# Patient Record
Sex: FEMALE | Race: WHITE | NOT HISPANIC OR LATINO | Employment: UNEMPLOYED | URBAN - METROPOLITAN AREA
[De-identification: names, ages, dates, MRNs, and addresses within clinical notes are randomized per-mention and may not be internally consistent; named-entity substitution may affect disease eponyms.]

---

## 2021-03-03 ENCOUNTER — HOSPITAL ENCOUNTER (OUTPATIENT)
Dept: INFUSION THERAPY | Facility: HOSPITAL | Age: 26
Discharge: HOME | End: 2021-03-03
Attending: ADVANCED PRACTICE MIDWIFE

## 2021-03-03 LAB
ABO GROUP BLD: NORMAL
BLD GP AB SCN SERPL QL: NORMAL
CONV ABD CONTROL: NORMAL
RH BLD: NORMAL

## 2021-03-24 ENCOUNTER — OFFICE VISIT CONVERTED (OUTPATIENT)
Dept: INTERNAL MEDICINE | Facility: CLINIC | Age: 26
End: 2021-03-24
Attending: STUDENT IN AN ORGANIZED HEALTH CARE EDUCATION/TRAINING PROGRAM

## 2021-05-14 NOTE — PROGRESS NOTES
"   Progress Note      Patient Name: Kimberley Bauer   Patient ID: [id number]   Sex: Female   YOB: 1995        Visit Date: 2021    Provider: Gauri Rodrigues MD   Location: [facility]   Location Address: [address] [place] [address], [address]  [place], [place]  [id number]   Location Phone: [phone]          Chief Complaint  · Looking for pediatrician      History Of Present Illness  Currently 31 weeks pregnant with due date of 2021.   Gender: Boy.   Name chosen: Narinder Bauer.   Other children in the home: Yes.   Current OB: Dr. Durham.   Hospital you will be delivering at: [facility].   Current medications: Prenatal 19 29 mg iron- 1 mg oral tablet,chewable   Was an ultrasound completed at 18-22 weeks: Yes.   Results from the ultrasound were: Normal.   Any complications with this pregnancy: No.   Birth plan/thoughts about delivery: .   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   Have you decided on circumcision: Yes.   Method of feeding: Breast.   Any concerns you have about your child's health: None.   Congratulations! If you deliver at [facility], they will notify us when your baby is born, and we will come to the hospital to evaluate the baby. At delivery, you should expect antibiotic prophylaxis on the baby's eyes, Hepatitis B vaccination, and Vitamin K injection. Prior to Discharge, your baby will have congenital heart disease screen, hearing screen, and state mandated  screen that test for approximately 50 different genetic and metabolic diseases that are otherwise difficult to detect. You should plan on staying 48 hours and need to bring a rear facing car seat to take the baby home. The hospital has classes to help prepare for the delivery.   Our office does have both sick and well waiting rooms. Someone is on call and available . We have a breast feeding consultant available. We house CDC " recommended vaccines, have x-rays, can suture and splint.   Call us if you have any questions prior to delivery. If we do not hear from you, best wishes for the remainder of your pregnancy.      She also has a 3 y.o. son  who follows with Dr. Paget (Sandy's children).   She is 31 weeks pregnant and will be having a boy. Due date is . Plans to deliver at [facility]. Planning on having a repeat  with tubal ligation.   Her first pregnancy was via  due to herpes outbreak at the time of her due date.    Denies any hx of asthma, congenital heart disease, or seizure in the family. Mother with hx of unspecified tachycardia. She's seen multiple cardiologist, and has had echocardiogram which have been unrevealing. She has never had a stress test. Baby's father is healthy and w/o any known health conditions except for herpes.          Medication List  Name Date Started Instructions   Prenatal 19 29 mg iron- 1 mg oral tablet,chewable  chew 1 tablet by oral route once daily         Allergy List  Allergen Name Date Reaction Notes   tramadol --  --  --        Allergies Reconciled  Family Medical History  Disease Name Relative/Age Notes   Cancer, Unspecified Mother/   Mother         Social History  Finding Status Start/Stop Quantity Notes   Alcohol Use Never --/-- --  does not drink   Claustophobic Unknown --/-- --  yes   lives with spouse --  --/-- --  --    . --  --/-- --  --    Recreational Drug Use Never --/-- --  no   Tobacco Current every day --/-- 0.5 PPD current every day smoker, 0.5 pack per day, smoked 3 years   Working --  --/-- --  --          Physical Examination  · Constitutional  o Appearance  o : no acute distress, well-nourished  · Head and Face  o Head  o :   § Inspection  § : atraumatic, normocephalic  · Ears, Nose, Mouth and Throat  o Ears  o :   § External Ears  § : normal  o Nose  o :   § Intranasal Exam  § : nares patent  · Respiratory  o Respiratory Effort  o : breathing comfortably,  symmetric chest rise  · Gastrointestinal  o Abdominal Examination  o : Pregnant abdomen   · Neurologic  o Mental Status Examination  o :   § Orientation  § : grossly oriented to person, place and time  o Gait and Station  o :   § Gait Screening  § : normal gait          Assessment  · Prenatal Care, Other Normal Pregnancy     V22.1  Expected mother in search of new pediatrician. Reviewed services provided by our office. All questions and concerns answered.    Problems Reconciled  Plan  · Orders  o Expectant Mother (EXMOM) - V22.1 - 03/24/2021  · Medications  o Medications have been Reconciled  o Transition of Care or Provider Policy            Electronically Signed by: Gauri Rodrigues MD -Author on March 24, 2021 07:12:21 PM

## 2021-05-19 ENCOUNTER — HOSPITAL ENCOUNTER (OUTPATIENT)
Dept: PREADMISSION TESTING | Facility: HOSPITAL | Age: 26
Discharge: HOME | End: 2021-05-19
Attending: OBSTETRICS & GYNECOLOGY

## 2021-05-20 LAB — SARS-COV-2 RNA SPEC QL NAA+PROBE: NOT DETECTED

## 2022-10-12 ENCOUNTER — HOSPITAL ENCOUNTER (EMERGENCY)
Facility: HOSPITAL | Age: 27
Discharge: HOME | End: 2022-10-12
Attending: EMERGENCY MEDICINE | Admitting: EMERGENCY MEDICINE

## 2022-10-12 ENCOUNTER — APPOINTMENT (OUTPATIENT)
Dept: GENERAL RADIOLOGY | Facility: HOSPITAL | Age: 27
End: 2022-10-12

## 2022-10-12 VITALS
HEIGHT: 62 IN | BODY MASS INDEX: 28.93 KG/M2 | TEMPERATURE: 98.4 F | HEART RATE: 77 BPM | DIASTOLIC BLOOD PRESSURE: 65 MMHG | WEIGHT: 157.19 LBS | OXYGEN SATURATION: 100 % | RESPIRATION RATE: 13 BRPM | SYSTOLIC BLOOD PRESSURE: 103 MMHG

## 2022-10-12 DIAGNOSIS — S50.812A ABRASION OF LEFT FOREARM, INITIAL ENCOUNTER: ICD-10-CM

## 2022-10-12 DIAGNOSIS — S60.031A CONTUSION OF RIGHT MIDDLE FINGER WITHOUT DAMAGE TO NAIL, INITIAL ENCOUNTER: ICD-10-CM

## 2022-10-12 DIAGNOSIS — S39.012A STRAIN OF LUMBAR REGION, INITIAL ENCOUNTER: Primary | ICD-10-CM

## 2022-10-12 DIAGNOSIS — S63.681A OTHER SPRAIN OF RIGHT THUMB, INITIAL ENCOUNTER: ICD-10-CM

## 2022-10-12 PROCEDURE — 99284 EMERGENCY DEPT VISIT MOD MDM: CPT

## 2022-10-12 PROCEDURE — 25010000002 HYDROMORPHONE 1 MG/ML SOLUTION: Performed by: EMERGENCY MEDICINE

## 2022-10-12 PROCEDURE — 72100 X-RAY EXAM L-S SPINE 2/3 VWS: CPT

## 2022-10-12 PROCEDURE — 25010000002 KETOROLAC TROMETHAMINE PER 15 MG: Performed by: EMERGENCY MEDICINE

## 2022-10-12 PROCEDURE — 73130 X-RAY EXAM OF HAND: CPT

## 2022-10-12 PROCEDURE — 96375 TX/PRO/DX INJ NEW DRUG ADDON: CPT

## 2022-10-12 PROCEDURE — 96374 THER/PROPH/DIAG INJ IV PUSH: CPT

## 2022-10-12 RX ORDER — KETOROLAC TROMETHAMINE 15 MG/ML
15 INJECTION, SOLUTION INTRAMUSCULAR; INTRAVENOUS ONCE
Status: COMPLETED | OUTPATIENT
Start: 2022-10-12 | End: 2022-10-12

## 2022-10-12 RX ORDER — LORAZEPAM 1 MG/1
1 TABLET ORAL EVERY 8 HOURS PRN
COMMUNITY

## 2022-10-12 RX ADMIN — HYDROMORPHONE HYDROCHLORIDE 0.5 MG: 1 INJECTION, SOLUTION INTRAMUSCULAR; INTRAVENOUS; SUBCUTANEOUS at 09:02

## 2022-10-12 RX ADMIN — KETOROLAC TROMETHAMINE 15 MG: 15 INJECTION, SOLUTION INTRAMUSCULAR; INTRAVENOUS at 10:45
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
## 2022-10-12 NOTE — DISCHARGE INSTRUCTIONS
Apply ice to those areas of discomfort as needed.  Avoid strenuous activity until symptoms resolve.

## 2023-01-09 ENCOUNTER — OFFICE VISIT (OUTPATIENT)
Dept: INTERNAL MEDICINE | Facility: CLINIC | Age: 28
End: 2023-01-09
Payer: COMMERCIAL

## 2023-01-09 VITALS
TEMPERATURE: 98.1 F | WEIGHT: 138.4 LBS | DIASTOLIC BLOOD PRESSURE: 72 MMHG | BODY MASS INDEX: 25.31 KG/M2 | OXYGEN SATURATION: 99 % | HEART RATE: 62 BPM | SYSTOLIC BLOOD PRESSURE: 110 MMHG

## 2023-01-09 DIAGNOSIS — F41.9 ANXIETY: ICD-10-CM

## 2023-01-09 DIAGNOSIS — M54.42 CHRONIC BILATERAL LOW BACK PAIN WITH BILATERAL SCIATICA: ICD-10-CM

## 2023-01-09 DIAGNOSIS — F33.1 MODERATE EPISODE OF RECURRENT MAJOR DEPRESSIVE DISORDER: ICD-10-CM

## 2023-01-09 DIAGNOSIS — Z76.89 ESTABLISHING CARE WITH NEW DOCTOR, ENCOUNTER FOR: Primary | ICD-10-CM

## 2023-01-09 DIAGNOSIS — M54.41 CHRONIC BILATERAL LOW BACK PAIN WITH BILATERAL SCIATICA: ICD-10-CM

## 2023-01-09 DIAGNOSIS — F43.10 PTSD (POST-TRAUMATIC STRESS DISORDER): ICD-10-CM

## 2023-01-09 DIAGNOSIS — G89.29 CHRONIC BILATERAL LOW BACK PAIN WITH BILATERAL SCIATICA: ICD-10-CM

## 2023-01-09 PROCEDURE — 99204 OFFICE O/P NEW MOD 45 MIN: CPT | Performed by: NURSE PRACTITIONER

## 2023-01-09 RX ORDER — BUPROPION HYDROCHLORIDE 150 MG/1
150 TABLET ORAL EVERY MORNING
COMMUNITY
Start: 2022-12-26

## 2023-01-09 RX ORDER — PROPRANOLOL HYDROCHLORIDE 10 MG/1
10 TABLET ORAL DAILY
COMMUNITY
Start: 2022-12-26

## 2023-01-09 NOTE — PROGRESS NOTES
Chief Complaint  Establish Care and Back Pain    Subjective        Kimberley Bauer presents to [facility] for History of Present Illness  Establishment of care, concerns regarding chronic low back pain and depression anxiety follow-up.    Patient is needing to establish with new PCP, she has not had a regular PCP in 2 to 3 years.  Patient reports she was unable to get any answers in regards to her back pain, so she eventually stopped going.  She states that her low back pain has been going on for 3 to 4 years.  She did get some imaging, which showed some narrowing of her spine and there was no course of treatment that was recommended.  She reports that she still has continuous back pain, reporting pain at least daily.  She reports occasional tingling into the lower extremities bilaterally.  No significant numbness.  No loss of bowel or bladder control.  She was mostly recently seen in the emergency room, they did x-rays at that time, no abnormalities.  She has 2 young children, and is concerned that she cannot help them when needed sometimes due to the amount of pain she is having in her back.    Patient is also seen by psychiatry and therapy, she has longstanding PTSD, depression, anxiety.  She has been treated with medications that are all prescribed by them.  They are still working to find a good mix for her.  She does not have any major concerns regarding that today.    She denies any other significant past medical problems.  She would like to get scheduled for annual exam and lab work.       Objective   Vital Signs:   /72 (BP Location: Right arm, Patient Position: Sitting, Cuff Size: Adult)   Pulse 62   Temp 98.1 °F (36.7 °C) (Temporal)   Wt 62.8 kg (138 lb 6.4 oz)   SpO2 99%   BMI 25.31 kg/m²     Physical Exam  Vitals and nursing note reviewed.   Constitutional:       Appearance: Normal appearance. She is normal weight.   HENT:      Head: Normocephalic and atraumatic.    Cardiovascular:      Rate and Rhythm: Normal rate.   Pulmonary:      Effort: Pulmonary effort is normal.   Neurological:      Mental Status: She is alert.   Psychiatric:         Mood and Affect: Mood normal.         Thought Content: Thought content normal.        Result Review :  {The following data was reviewed by CORAL Carpio on 01/09/23                Diagnoses and all orders for this visit:    1. Establishing care with new doctor, encounter for (Primary)    2. Chronic bilateral low back pain with bilateral sciatica  -     Ambulatory Referral to Physical Therapy Evaluate and treat    3. Moderate episode of recurrent major depressive disorder (HCC)    4. Anxiety    5. PTSD (post-traumatic stress disorder)    Other orders  -     diclofenac (VOLTAREN) 50 MG EC tablet; Take 1 tablet by mouth 3 (Three) Times a Day As Needed (pain).  Dispense: 90 tablet; Refill: 1    Patient's primary concern today is her chronic low back pain, we will go ahead and start anti-inflammatories and refer to physical therapy.  We discussed typical course and common medications used for this.  She will follow-up if her symptoms worsen.  MRI would be considered if failing with physical therapy.  Both x-rays were reviewed, 1 from 2022, 1 from 2021, no major concerns seen there.  We will plan to follow-up in 3 months regardless, complete annual physical at that time with labs.      Follow Up   Return in about 3 months (around 4/9/2023) for Recheck, Annual physical.  Patient was given instructions and counseling regarding her condition or for health maintenance advice. Please see specific information pulled into the AVS if appropriate.     CORAL Carpio  1/9/2023  This note was electronically signed.

## 2023-03-07 ENCOUNTER — OFFICE VISIT (OUTPATIENT)
Dept: INTERNAL MEDICINE | Facility: CLINIC | Age: 28
End: 2023-03-07
Payer: COMMERCIAL

## 2023-03-07 VITALS
SYSTOLIC BLOOD PRESSURE: 130 MMHG | BODY MASS INDEX: 24.98 KG/M2 | OXYGEN SATURATION: 100 % | RESPIRATION RATE: 18 BRPM | WEIGHT: 136.6 LBS | TEMPERATURE: 98.4 F | HEART RATE: 77 BPM | DIASTOLIC BLOOD PRESSURE: 88 MMHG

## 2023-03-07 DIAGNOSIS — M54.41 CHRONIC BILATERAL LOW BACK PAIN WITH BILATERAL SCIATICA: Primary | ICD-10-CM

## 2023-03-07 DIAGNOSIS — G89.29 CHRONIC BILATERAL LOW BACK PAIN WITH BILATERAL SCIATICA: Primary | ICD-10-CM

## 2023-03-07 DIAGNOSIS — M54.42 CHRONIC BILATERAL LOW BACK PAIN WITH BILATERAL SCIATICA: Primary | ICD-10-CM

## 2023-03-07 PROCEDURE — 99213 OFFICE O/P EST LOW 20 MIN: CPT | Performed by: NURSE PRACTITIONER

## 2023-03-07 RX ORDER — CYCLOBENZAPRINE HCL 5 MG
5 TABLET ORAL 3 TIMES DAILY PRN
Qty: 30 TABLET | Refills: 0 | Status: SHIPPED | OUTPATIENT
Start: 2023-03-07

## 2023-03-07 RX ORDER — MELOXICAM 15 MG/1
15 TABLET ORAL DAILY
Qty: 30 TABLET | Refills: 1 | Status: SHIPPED | OUTPATIENT
Start: 2023-03-07

## 2023-03-07 RX ORDER — CLORAZEPATE DIPOTASSIUM 7.5 MG/1
1 TABLET ORAL EVERY 12 HOURS SCHEDULED
COMMUNITY
Start: 2023-02-09

## 2023-03-07 NOTE — PROGRESS NOTES
Chief Complaint  Pain (Would like to try different medicine feels like diclofenac doesn't work on top of physical therapy )    Subjective        Kimberley Bauer presents to [facility] for History of Present Illness  Follow-up for low back pain.  Patient reports that she has been using diclofenac for the last 4 weeks and doing physical therapy for the last 3, she has not had any significant relief in her pain.  She wanted to discuss other medications that she could use while continuing with physical therapy.  Her physical therapy regimen has been once weekly for the last 3 weeks, she will be starting twice weekly starting next week.  Plan was for a full 8 weeks.  Reports no significant relief in the pain.  She feels like her hips are looser, but still having discomfort.  Stating that diclofenac has not been helpful.  No other significant concerns or complaints today.       Objective   Vital Signs:   /88 (BP Location: Left arm, Patient Position: Sitting, Cuff Size: Adult)   Pulse 77   Temp 98.4 °F (36.9 °C) (Temporal)   Resp 18   Wt 62 kg (136 lb 9.6 oz)   SpO2 100%   BMI 24.98 kg/m²     Physical Exam  Vitals and nursing note reviewed.   Constitutional:       Appearance: Normal appearance.   HENT:      Head: Normocephalic and atraumatic.   Pulmonary:      Effort: Pulmonary effort is normal.   Neurological:      Mental Status: She is alert.   Psychiatric:         Mood and Affect: Mood normal.         Thought Content: Thought content normal.        Result Review :  {The following data was reviewed by CORAL Carpio on 03/07/23                Diagnoses and all orders for this visit:    1. Chronic bilateral low back pain with bilateral sciatica (Primary)    Other orders  -     meloxicam (MOBIC) 15 MG tablet; Take 1 tablet by mouth Daily.  Dispense: 30 tablet; Refill: 1  -     cyclobenzaprine (FLEXERIL) 5 MG tablet; Take 1 tablet by mouth 3 (Three) Times a Day As Needed for Muscle  Spasms.  Dispense: 30 tablet; Refill: 0    Discussed switching to meloxicam and intermittent use of cyclobenzaprine, just to be used as needed, primarily at night.  Encourage patient to continue with physical therapy, if she can complete at least 3 more weeks, if there is no significant relief, would go ahead and place order for MRI for further investigation.  Patient understands and agrees with plan of care.      Follow Up   No follow-ups on file.  Patient was given instructions and counseling regarding her condition or for health maintenance advice. Please see specific information pulled into the AVS if appropriate.     Jeffery Pino, CORAL  3/7/2023  This note was electronically signed.

## 2023-05-01 RX ORDER — MELOXICAM 15 MG/1
TABLET ORAL
Qty: 30 TABLET | Refills: 1 | Status: SHIPPED | OUTPATIENT
Start: 2023-05-01

## 2023-06-14 ENCOUNTER — OFFICE VISIT (OUTPATIENT)
Dept: INTERNAL MEDICINE | Facility: CLINIC | Age: 28
End: 2023-06-14
Payer: COMMERCIAL

## 2023-06-14 VITALS
DIASTOLIC BLOOD PRESSURE: 80 MMHG | TEMPERATURE: 98.5 F | OXYGEN SATURATION: 100 % | WEIGHT: 140.6 LBS | SYSTOLIC BLOOD PRESSURE: 124 MMHG | HEART RATE: 106 BPM | BODY MASS INDEX: 25.88 KG/M2 | HEIGHT: 62 IN

## 2023-06-14 DIAGNOSIS — M54.41 CHRONIC BILATERAL LOW BACK PAIN WITH BILATERAL SCIATICA: Primary | ICD-10-CM

## 2023-06-14 DIAGNOSIS — G89.29 CHRONIC BILATERAL LOW BACK PAIN WITH BILATERAL SCIATICA: Primary | ICD-10-CM

## 2023-06-14 DIAGNOSIS — M54.42 CHRONIC BILATERAL LOW BACK PAIN WITH BILATERAL SCIATICA: Primary | ICD-10-CM

## 2023-06-14 RX ORDER — ALPRAZOLAM 1 MG/1
1 TABLET ORAL 3 TIMES DAILY PRN
COMMUNITY
Start: 2023-05-31

## 2023-06-14 RX ORDER — BUPROPION HYDROCHLORIDE 300 MG/1
300 TABLET ORAL EVERY MORNING
COMMUNITY
Start: 2023-05-27

## 2023-06-14 NOTE — PROGRESS NOTES
"Chief Complaint  Back Pain (Pt states she is here for follow up on PT as well as wanting to get MRI)    Subjective        Kimberley Bauer presents to [facility] for follow-up for back pain.  Patient has been experiencing pain now for several months, at least 6.  We have tried different anti-inflammatories, muscle relaxers, we referred for physical therapy, she has completed at least 4 sessions with them, did have to miss some due to illness of her or her children.  Pain continues.  She does report some numbness and tingling that radiates around her bilateral hips.  She denies any saddle anesthesia, she has never lost bowel or bladder ability.    Objective   Vital Signs:   /80 (BP Location: Left arm, Patient Position: Sitting, Cuff Size: Adult)   Pulse 106   Temp 98.5 °F (36.9 °C) (Temporal)   Ht 157.5 cm (62\")   Wt 63.8 kg (140 lb 9.6 oz)   SpO2 100%   BMI 25.72 kg/m²     Physical Exam  Vitals and nursing note reviewed.   Constitutional:       Appearance: Normal appearance.   HENT:      Head: Normocephalic and atraumatic.   Pulmonary:      Effort: Pulmonary effort is normal.   Musculoskeletal:      Lumbar back: Tenderness present. No swelling, edema or deformity.   Neurological:      Mental Status: She is alert.      Result Review :  {The following data was reviewed by CORAL Carpio on 06/14/23                Diagnoses and all orders for this visit:    1. Chronic bilateral low back pain with bilateral sciatica (Primary)  -     MRI Lumbar Spine Without Contrast; Future    Discussed with patient neck step would be MRI imaging.  We will get this ordered today.  We will follow-up with her after those results are available.  Can continue with anti-inflammatories for now.  No red flag symptoms.  If any worsening, would recommend very close follow-up including but not limited to emergency room if needed.  Patient understands and agrees.      Follow Up   No follow-ups on " file.  Patient was given instructions and counseling regarding her condition or for health maintenance advice. Please see specific information pulled into the AVS if appropriate.     Jeffery Pino, APRN  6/14/2023  This note was electronically signed.

## 2023-07-31 ENCOUNTER — TELEPHONE (OUTPATIENT)
Dept: INTERNAL MEDICINE | Facility: CLINIC | Age: 28
End: 2023-07-31
Payer: COMMERCIAL

## 2023-07-31 DIAGNOSIS — M53.9 MULTILEVEL DEGENERATIVE DISC DISEASE: Primary | ICD-10-CM

## 2023-07-31 DIAGNOSIS — G89.29 CHRONIC BILATERAL LOW BACK PAIN WITHOUT SCIATICA: ICD-10-CM

## 2023-07-31 DIAGNOSIS — M54.50 CHRONIC BILATERAL LOW BACK PAIN WITHOUT SCIATICA: ICD-10-CM

## 2023-08-23 ENCOUNTER — OFFICE VISIT (OUTPATIENT)
Dept: NEUROSURGERY | Facility: CLINIC | Age: 28
End: 2023-08-23
Payer: COMMERCIAL

## 2023-08-23 VITALS
SYSTOLIC BLOOD PRESSURE: 120 MMHG | HEART RATE: 67 BPM | WEIGHT: 133 LBS | DIASTOLIC BLOOD PRESSURE: 86 MMHG | HEIGHT: 62 IN | BODY MASS INDEX: 24.48 KG/M2

## 2023-08-23 DIAGNOSIS — M54.42 CHRONIC MIDLINE LOW BACK PAIN WITH BILATERAL SCIATICA: ICD-10-CM

## 2023-08-23 DIAGNOSIS — M47.816 LUMBAR FACET ARTHROPATHY: ICD-10-CM

## 2023-08-23 DIAGNOSIS — G89.29 CHRONIC MIDLINE LOW BACK PAIN WITH BILATERAL SCIATICA: ICD-10-CM

## 2023-08-23 DIAGNOSIS — M51.27 HERNIATED NUCLEUS PULPOSUS, L5-S1, LEFT: Primary | ICD-10-CM

## 2023-08-23 DIAGNOSIS — M54.41 CHRONIC MIDLINE LOW BACK PAIN WITH BILATERAL SCIATICA: ICD-10-CM

## 2023-08-23 NOTE — PROGRESS NOTES
"Chief Complaint  Back Pain, Leg Pain (Bilateral to feet), Tingling (Bilateral hips to feet), Numbness (Bilateral hips to feet), and Hip Pain (Bilateral )    Subjective          Kimberley Bauer who is a 27 y.o. year old female who presents to [facility] for Evaluation of the Spine.     The patient complains of pain located in the Lumbar Spine.  Patients states the pain has been present for 6 years.  The pain came on acutely.  The pain scaled level is 6.  The pain does radiate. Dermatomes are located bilaterally Lumbar at: below the knee and to all the toes.  The pain is constant and waxing/waning and described as sharp and \"heaviness\" .  The pain is worse at no particular time of day. Patient states Heat makes the pain better.  Patient states  pushing to have a bowel movement, prolonged standing or sitting, cleaning the house makes the pain worse.    Associated Symptoms Include: Numbness, Tingling, and Weakness. Denies loss of bowel or bladder control, outside of a period of time where she had issues controlling the bladder lasting for several months and then resolving.  Conservative Interventions Include: Physical Therapy that was not very effective. NSAIDs which were ineffective. Chiropractor which was not very effective.    Was this the result of an injury or accident? : No    History of Previous Spinal Surgery?: No     reports that she has been smoking cigarettes. She has been smoking an average of .25 packs per day. She has never used smokeless tobacco.    Review of Systems   Musculoskeletal:  Positive for back pain.      Objective   Vital Signs:   /86   Pulse 67   Ht 157.5 cm (62\")   Wt 60.3 kg (133 lb)   BMI 24.33 kg/mý       Physical Exam  Constitutional:       Appearance: Normal appearance.   Pulmonary:      Effort: Pulmonary effort is normal.   Musculoskeletal:         General: Tenderness (midline lumbar and right lumbar area) present.      Comments: SLR " on the right causes pain in the right lower back   Neurological:      General: No focal deficit present.      Mental Status: She is alert and oriented to person, place, and time.      Sensory: Sensory deficit (reduced in right lower leg) present.      Motor: No weakness.      Deep Tendon Reflexes: Reflexes normal.   Psychiatric:         Mood and Affect: Mood normal.         Behavior: Behavior normal.      Neurologic Exam     Mental Status   Oriented to person, place, and time.      Result Review     I have personally reviewed the MRI of the lumbar spine without contrast from 7/18/2023 which shows multilevel degenerative disc disease and facet arthritis worse at L5-S1 where there is severe left foraminal stenosis and moderate right foraminal stenosis.     Assessment and Plan    Diagnoses and all orders for this visit:    1. Herniated nucleus pulposus, L5-S1, left (Primary)    2. Lumbar facet arthropathy    3. Chronic midline low back pain with bilateral sciatica    She has pain in the back and down both legs to all the toes.    She has some stenosis on the left at L5-S1 which may affect the exiting L5 or the traversing S1 nerve on the left.    We discussed that surgery may be less likely to help for non-specific leg pain. Overall, it would be most likely to help the left leg complaints, unlikely to help the right leg or lower back pain.    She may consider LESB as a conservative treatment. I will refer her to [facility] in [place].    The patient was counseled on basic recommendations for the reduction and prevention of back, neck, or spine pain in association with spinal disorders, including: cessation/avoidance of nicotine use, maintenance of a healthy BMI and weight, focusing on building/maintaining core strength through core exercise, and avoidance of activities which worsen the pain. The patient will monitor for changes in symptoms and notify our clinic of these changes as needed.    She will follow-up here  PRN.    Follow Up   Return if symptoms worsen or fail to improve.  Patient was given instructions and counseling regarding her condition or for health maintenance advice. Please see specific information pulled into the AVS if appropriate.

## 2023-08-24 ENCOUNTER — PATIENT ROUNDING (BHMG ONLY) (OUTPATIENT)
Dept: NEUROLOGY | Facility: CLINIC | Age: 28
End: 2023-08-24
Payer: COMMERCIAL

## 2025-01-23 ENCOUNTER — HOSPITAL ENCOUNTER (EMERGENCY)
Facility: HOSPITAL | Age: 30
Discharge: HOME | End: 2025-01-23
Attending: EMERGENCY MEDICINE

## 2025-01-23 VITALS
BODY MASS INDEX: 23.53 KG/M2 | WEIGHT: 127.87 LBS | HEART RATE: 72 BPM | SYSTOLIC BLOOD PRESSURE: 128 MMHG | TEMPERATURE: 98.8 F | RESPIRATION RATE: 16 BRPM | OXYGEN SATURATION: 100 % | DIASTOLIC BLOOD PRESSURE: 72 MMHG | HEIGHT: 62 IN

## 2025-01-23 DIAGNOSIS — K02.9 DENTAL CARIES: Primary | ICD-10-CM

## 2025-01-23 PROCEDURE — 99282 EMERGENCY DEPT VISIT SF MDM: CPT

## 2025-01-23 PROCEDURE — 96372 THER/PROPH/DIAG INJ SC/IM: CPT

## 2025-01-23 PROCEDURE — 25010000002 KETOROLAC TROMETHAMINE PER 15 MG: Performed by: EMERGENCY MEDICINE

## 2025-01-23 RX ORDER — KETOROLAC TROMETHAMINE 30 MG/ML
60 INJECTION, SOLUTION INTRAMUSCULAR; INTRAVENOUS ONCE
Status: COMPLETED | OUTPATIENT
Start: 2025-01-23 | End: 2025-01-23

## 2025-01-23 RX ORDER — AMOXICILLIN 875 MG/1
875 TABLET, COATED ORAL 2 TIMES DAILY
Qty: 20 TABLET | Refills: 0 | Status: SHIPPED | OUTPATIENT
Start: 2025-01-23

## 2025-01-23 RX ORDER — KETOROLAC TROMETHAMINE 10 MG/1
10 TABLET, FILM COATED ORAL EVERY 6 HOURS PRN
Qty: 15 TABLET | Refills: 0 | Status: SHIPPED | OUTPATIENT
Start: 2025-01-23

## 2025-01-23 RX ADMIN — KETOROLAC TROMETHAMINE 60 MG: 30 INJECTION, SOLUTION INTRAMUSCULAR at 10:44

## 2025-01-23 NOTE — ED PROVIDER NOTES
Time: 10:07 AM EST  Date of encounter:  2025  Independent Historian/Clinical History and Information was obtained by:   Patient    History is limited by: N/A    Chief Complaint: Jaw pain      History of Present Illness:  Patient is a 29 y.o. year old female who presents to the emergency department for evaluation of right-sided jaw pain for the past day.  Patient denies fever and chills.  Patient has no nausea, vomiting, or diarrhea.  Patient has no cough hemoptysis.  Patient also now reports right-sided neck pain and some mild swelling.      Patient Care Team  Primary Care Provider: Jeffery Pino APRN    Past Medical History:     Allergies   Allergen Reactions    Tramadol Rash     Past Medical History:   Diagnosis Date    Depression     Heart palpitations      Past Surgical History:   Procedure Laterality Date     SECTION      x2    WISDOM TOOTH EXTRACTION       History reviewed. No pertinent family history.    Home Medications:  Prior to Admission medications    Medication Sig Start Date End Date Taking? Authorizing Provider   ALPRAZolam (XANAX) 1 MG tablet Take 1 tablet by mouth 3 (Three) Times a Day As Needed for Anxiety.  Patient not taking: Reported on 2023  Maribel Varghese MD   buPROPion XL (WELLBUTRIN XL) 300 MG 24 hr tablet Take 1 tablet by mouth Every Morning.  Patient not taking: Reported on 2023  Maribel Varghese MD        Social History:   Social History     Tobacco Use    Smoking status: Every Day     Current packs/day: 0.25     Types: Cigarettes    Smokeless tobacco: Never   Vaping Use    Vaping status: Never Used   Substance Use Topics    Alcohol use: Not Currently    Drug use: Never         Review of Systems:  Review of Systems   Constitutional:  Negative for chills and fever.   HENT:  Negative for congestion, rhinorrhea and sore throat.    Eyes:  Negative for pain and visual disturbance.   Respiratory:  Negative for apnea, cough,  "chest tightness and shortness of breath.    Cardiovascular:  Negative for chest pain and palpitations.   Gastrointestinal:  Negative for abdominal pain, diarrhea, nausea and vomiting.   Genitourinary:  Negative for difficulty urinating and dysuria.   Musculoskeletal:  Negative for joint swelling and myalgias.   Skin:  Negative for color change.   Neurological:  Negative for seizures and headaches.   Psychiatric/Behavioral: Negative.     All other systems reviewed and are negative.       Physical Exam:  /72 (BP Location: Left arm, Patient Position: Lying)   Pulse 72   Temp 98.8 °F (37.1 °C) (Oral)   Resp 16   Ht 157.5 cm (62\")   Wt 58 kg (127 lb 13.9 oz)   SpO2 100%   BMI 23.39 kg/m²     Physical Exam  Vitals and nursing note reviewed.   Constitutional:       General: She is not in acute distress.     Appearance: Normal appearance. She is not toxic-appearing.   HENT:      Head: Normocephalic and atraumatic.      Jaw: There is normal jaw occlusion.      Mouth/Throat:      Comments: (+) Poor dentition with rotten teeth to right mandible  Eyes:      General: Lids are normal.      Extraocular Movements: Extraocular movements intact.      Conjunctiva/sclera: Conjunctivae normal.      Pupils: Pupils are equal, round, and reactive to light.   Neck:      Comments: (+) Right cervical tender lymphadenopathy  Cardiovascular:      Rate and Rhythm: Normal rate and regular rhythm.      Pulses: Normal pulses.      Heart sounds: Normal heart sounds.   Pulmonary:      Effort: Pulmonary effort is normal. No respiratory distress.      Breath sounds: Normal breath sounds. No wheezing or rhonchi.   Abdominal:      General: Abdomen is flat.      Palpations: Abdomen is soft.      Tenderness: There is no abdominal tenderness. There is no guarding or rebound.   Musculoskeletal:         General: Normal range of motion.      Cervical back: Normal range of motion and neck supple.      Right lower leg: No edema.      Left lower leg: " No edema.   Skin:     General: Skin is warm and dry.   Neurological:      Mental Status: She is alert and oriented to person, place, and time. Mental status is at baseline.   Psychiatric:         Mood and Affect: Mood normal.                    Medical Decision Making:      Comorbidities that affect care:    None    External Notes reviewed:    Previous Clinic Note: Patient was last seen in clinic for back pain.      The following orders were placed and all results were independently analyzed by me:  No orders of the defined types were placed in this encounter.      Medications Given in the Emergency Department:  Medications   ketorolac (TORADOL) injection 60 mg (60 mg Intramuscular Given 1/23/25 1044)        ED Course:         Labs:    Lab Results (last 24 hours)       ** No results found for the last 24 hours. **             Imaging:    No Radiology Exams Resulted Within Past 24 Hours      Differential Diagnosis and Discussion:    Dental Pain: Differential diagnosis includes but is not limited to dental caries, periodontitis, pericoronitis, peridental abscess, gingival abscess, apthous stomatitis, allergic stomatitis, acute necrotizing ulcerative gingivitis, herpetic stomatitis.    PROCEDURES:        No orders to display       Procedures    MDM     Patient with poor dentition and will need antibiotics.  The patient has pharyngeal erythema, exudate, and pain consistent with pharyngitis.  Patient has no signs of abscess.  The patient was started on antibiotics in the Emergency Department. The patient is resting comfortably, feels better, is alert and in no distress after ED treatment. On re-examination the patient does not appear toxic has no meningeal signs, and there's no intractable vomiting. Based on the history, exam, diagnostic testing and reassessment, the patient has no signs of severe sepsis despite this infection.  The patient's vital signs have been stable. The patient's condition is stable and is  appropriate for discharge. The patient will pursue further outpatient evaluation with the primary care physician or other designated or consultant physician as indicated in the discharge instructions. The patient was counseled that a repeat examination within two days is imperative. This can be done as an outpatient. Patient advised to return to the ED if outpatient evaluation is not feasible. The patient was counseled to return to the ER sooner for worsening pain, neck or throat swelling, inability to swallow, respiratory distress. The patient was also counseled to return for worsening fever, chills, altered mental status, intractable vomiting or any other symptoms of concern.                Patient Care Considerations:    NARCOTICS: I considered prescribing opiate pain medication as an outpatient, however patient will be started on anti-inflammatories first.      Consultants/Shared Management Plan:    None    Social Determinants of Health:    Patient is independent, reliable, and has access to care.       Disposition and Care Coordination:    Discharged: The patient is suitable and stable for discharge with no need for consideration of admission.    I have explained the patient´s condition, diagnoses and treatment plan based on the information available to me at this time. I have answered questions and addressed any concerns. The patient has a good  understanding of the patient´s diagnosis, condition, and treatment plan as can be expected at this point. The vital signs have been stable. The patient´s condition is stable and appropriate for discharge from the emergency department.      The patient will pursue further outpatient evaluation with the primary care physician or other designated or consulting physician as outlined in the discharge instructions. They are agreeable to this plan of care and follow-up instructions have been explained in detail. The patient has received these instructions in written format  and has expressed an understanding of the discharge instructions. The patient is aware that any significant change in condition or worsening of symptoms should prompt an immediate return to this or the closest emergency department or call to 911.  I have explained discharge medications and the need for follow up with the patient/caretakers. This was also printed in the discharge instructions. Patient was discharged with the following medications and follow up:      Medication List        New Prescriptions      amoxicillin 875 MG tablet  Commonly known as: AMOXIL  Take 1 tablet by mouth 2 (Two) Times a Day.     ketorolac 10 MG tablet  Commonly known as: TORADOL  Take 1 tablet by mouth Every 6 (Six) Hours As Needed for Moderate Pain.               Where to Get Your Medications        These medications were sent to [id number] - [place], [place] - [address] - [phone] [facility] [phone]   [address] [place] [address]      Hours: 24-hours Phone: [phone]   amoxicillin 875 MG tablet  ketorolac 10 MG tablet      Jeffery Pino APRN  [address] [address] [phone]    In 2 days         Final diagnoses:   Dental caries        ED Disposition       ED Disposition   Discharge    Condition   Stable    Comment   --               This medical record created using voice recognition software.             Nestor Blue MD  01/24/25 5179